# Patient Record
Sex: FEMALE | Race: WHITE | URBAN - METROPOLITAN AREA
[De-identification: names, ages, dates, MRNs, and addresses within clinical notes are randomized per-mention and may not be internally consistent; named-entity substitution may affect disease eponyms.]

---

## 2023-07-05 ENCOUNTER — TELEPHONE (OUTPATIENT)
Facility: CLINIC | Age: 43
End: 2023-07-05

## 2023-07-06 NOTE — TELEPHONE ENCOUNTER
Left message to call back. Please offer patient first available appointment with ANY provider,or she can call her PCP or go to the ER. We cannot triage her call or make sooner appointments since she is a new patient. Thank you.

## 2023-07-18 NOTE — TELEPHONE ENCOUNTER
Dr Sima Villa    Would you like to see this patient? There are openings in August.    Thank you. contact guard

## 2023-07-18 NOTE — TELEPHONE ENCOUNTER
You may add the patient onto the office schedule in August.  If her symptoms are severe she should be seen in the ED.

## 2023-07-19 NOTE — TELEPHONE ENCOUNTER
I received a call back from the patient, /name verified. She is calling from \[facility]\"". She is available from 2023- 2023. Your appointments       Date & Time Appointment Department [facility])    Aug 07, 2023  4:30 PM CDT Consult with MD Nubia Johnson, [address] [place] [address],[address], [facility] ([facility])      Nubia Barriga, [facility], [facility] [place] [address],[address] Floors  [phone]          The patient verbalized the correct date,time and location of appt. I advised the patient to go to ED if her symptoms are or become severe.

## 2023-07-19 NOTE — TELEPHONE ENCOUNTER
The patient's phone number keeps ringing. I called Hosea, not on [facility]. He stated, the patient is in [facility] but he will let her call our office.     Please transfer to [id number]    Thanks

## 2023-11-01 ENCOUNTER — ORDER TRANSCRIPTION (OUTPATIENT)
Dept: ADMINISTRATIVE | Facility: HOSPITAL | Age: 43
End: 2023-11-01

## 2023-11-01 DIAGNOSIS — Z12.31 ENCOUNTER FOR SCREENING MAMMOGRAM FOR MALIGNANT NEOPLASM OF BREAST: Primary | ICD-10-CM

## 2023-11-27 ENCOUNTER — OFFICE VISIT (OUTPATIENT)
Facility: CLINIC | Age: 43
End: 2023-11-27
Payer: COMMERCIAL

## 2023-11-27 VITALS — WEIGHT: 161 LBS | HEART RATE: 84 BPM | DIASTOLIC BLOOD PRESSURE: 83 MMHG | SYSTOLIC BLOOD PRESSURE: 127 MMHG

## 2023-11-27 DIAGNOSIS — R19.7 DIARRHEA, UNSPECIFIED TYPE: Primary | ICD-10-CM

## 2023-11-27 PROCEDURE — 3074F SYST BP LT 130 MM HG: CPT | Performed by: INTERNAL MEDICINE

## 2023-11-27 PROCEDURE — 99214 OFFICE O/P EST MOD 30 MIN: CPT | Performed by: INTERNAL MEDICINE

## 2023-11-27 PROCEDURE — 3079F DIAST BP 80-89 MM HG: CPT | Performed by: INTERNAL MEDICINE

## 2023-11-27 RX ORDER — VALACYCLOVIR HYDROCHLORIDE 500 MG/1
1000 TABLET, FILM COATED ORAL DAILY
COMMUNITY

## 2023-11-29 ENCOUNTER — LAB ENCOUNTER (OUTPATIENT)
Dept: LAB | Facility: HOSPITAL | Age: 43
End: 2023-11-29
Attending: INTERNAL MEDICINE
Payer: COMMERCIAL

## 2023-11-29 DIAGNOSIS — R19.7 DIARRHEA, UNSPECIFIED TYPE: ICD-10-CM

## 2023-11-29 PROCEDURE — 87209 SMEAR COMPLEX STAIN: CPT

## 2023-11-29 PROCEDURE — 87177 OVA AND PARASITES SMEARS: CPT

## 2023-11-29 PROCEDURE — 87427 SHIGA-LIKE TOXIN AG IA: CPT

## 2023-11-29 PROCEDURE — 87045 FECES CULTURE AEROBIC BACT: CPT

## 2023-11-29 PROCEDURE — 87493 C DIFF AMPLIFIED PROBE: CPT

## 2023-11-29 PROCEDURE — 83993 ASSAY FOR CALPROTECTIN FECAL: CPT

## 2023-11-29 PROCEDURE — 87046 STOOL CULTR AEROBIC BACT EA: CPT

## 2023-11-30 ENCOUNTER — TELEPHONE (OUTPATIENT)
Facility: CLINIC | Age: 43
End: 2023-11-30

## 2023-11-30 DIAGNOSIS — R19.5 ELEVATED FECAL CALPROTECTIN: Primary | ICD-10-CM

## 2023-11-30 DIAGNOSIS — R19.7 DIARRHEA, UNSPECIFIED TYPE: ICD-10-CM

## 2023-11-30 LAB
C DIFF TOX B STL QL: NEGATIVE
CALPROTECTIN STL-MCNT: 160 ΜG/G (ref ?–50)

## 2023-11-30 NOTE — PROGRESS NOTES
Subjective:   Patient ID: Leann Rajan is a 44year old female. HPI  The patient is self-referred on the advice of her father who is a patient of mine. 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 The patient has a history of non-Hodgkin's lymphoma treated at [facility] (chemotherapy without radiation therapy). She has been in remission for about 5 years. The patient also has a history of an immunoglobulin deficiency and is treated with IVIG infusions every 6-10 weeks typically in [place]. For the past 2-1/2-3 years the patient has had \"GI issues\". She has a history of recurrent sinusitis and has been treated with multiple courses of antibiotics typically levofloxacin. Interestingly during the COVID-19 pandemic and isolation her episodes of sinusitis resolved. In mid 2020 she developed increased stool frequency of softer stools. She has undergone multiple stool tests and reportedly has been diagnosed with pathogenic E. coli, Salmonella and Cryptosporidium. These entities have been treated. She describes a GI PCR panel performed in [place] along with stool for fecal leukocytes. The patient describes stools that are soft and loose with some degree of urgency. Every 2 months she has very urgent stools. Interestingly previous courses of levofloxacin resulted in normal bowel movements. The effect diminished within the few days and therapeutic response became nonexistent after a few months. She also recalls being treated with ivermectin and nitazoxanide. She has not used loperamide. Eating rice will help. The patient's infectious disease physician recommended a trial of IVIG monthly for 6 months which did not influence the patient's symptoms. The patient's appetite is good. Her weight is stable to increased. She denies nausea, vomiting, dysphagia, odynophagia or heartburn. She has occasional abdominal bloating with heavy foods.   She has noted no bleeding. Prior to the onset of the above symptoms she would have normal bowel movements with the exception of chronic bloating and the requirement of using baby wipes for anal hygiene. Garlic pills have helped the patient's urgency. Her last course of antibiotics was 6 months prior. Past medical history:  1. Non-Hodgkin's lymphoma in remission  2. 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 History of psoriasis  4. Varicella suppression  5. Colonoscopy about 5 years prior. Medications:  1.  Gabapentin  2. No NSAIDs    Social history:  Non-smoker    Family history: Mother had a history of upper tract symptoms and possible H. pylori. History/Other:   Review of Systems  See above    Wt Readings from Last 7 Encounters:   11/27/23 161 lb (73 kg)       Current Outpatient Medications   Medication Sig Dispense Refill    valACYclovir 500 MG Oral Tab Take 2 tablets (1,000 mg total) by mouth daily. Ascorbic Acid 100 MG Oral Chew Tab Chew by mouth As Directed. Allergies: Allergies   Allergen Reactions    Other OTHER (SEE COMMENTS)    Penicillins HIVES       Objective:   Physical Exam  Vitals and nursing note reviewed. Constitutional:       General: She is not in acute distress. Appearance: She is well-developed. She is not ill-appearing, toxic-appearing or diaphoretic. Comments: Healthy in appearance   HENT:      Head: Normocephalic and atraumatic. Mouth/Throat:      Pharynx: No oropharyngeal exudate. Eyes:      General: No scleral icterus. Conjunctiva/sclera: Conjunctivae normal.   Neck:      Thyroid: No thyromegaly. Cardiovascular:      Rate and Rhythm: Normal rate and regular rhythm. Heart sounds: Normal heart sounds. Pulmonary:      Effort: Pulmonary effort is normal. No respiratory distress. Breath sounds: Normal breath sounds. No wheezing or rales. Abdominal:      General: Bowel sounds are normal. There is no distension. Palpations: Abdomen is soft. There is no mass. Tenderness: There is no abdominal tenderness. There is no guarding or rebound. Musculoskeletal:      Cervical back: Neck supple. Lymphadenopathy:      Cervical: No cervical adenopathy. Neurological:      Mental Status: She is alert and oriented to person, place, and time. Psychiatric:         Behavior: Behavior normal.         Assessment & Plan:   1. 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 She has persistent diarrhea without associated weight loss or other alarm symptoms. Possibilities would include chronic infections including Giardia versus macroscopic or microscopic IBD related or a sprue-like syndrome related to CVID (I presume serologic testing for celiac disease was performed). I doubt a more ominous cause such as a small bowel lymphoma or protein-losing enteropathy. The patient will forward previous records if available. She will obtain stool studies for infection and a fecal calprotectin. We discussed that colonoscopy and/or upper endoscopy may be necessary to further evaluate.       Orders Placed This Encounter   Procedures    Calprotectin, Fecal    C. diff toxigenic PCR (OPT)    Stool Culture W/ Shigatoxin    Ova and Parasites, traveler outside of country       Meds This Visit:  Requested Prescriptions      No prescriptions requested or ordered in this encounter       Imaging & Referrals:  None

## 2023-12-01 NOTE — TELEPHONE ENCOUNTER
GI Schedulers    Please assist with scheduling colonoscopy/egd with Dr. Rohan Saul per orders provided below. Can we schedule prior to the new year? Thank you!

## 2023-12-01 NOTE — TELEPHONE ENCOUNTER
Scheduled for:  Colonoscopy [id number] [phone] [id number]  Provider Name:  Dr. Nick Camara  Date:  12/8/2023  Location:  [facility]  Sedation:  MAC  Time:  10am, pt is aware [facility] will call with arrival  Prep:  suprep  Meds/Allergies Reconciled?:  Physician reviewed     Diagnosis with codes:   diarrhea in the setting of immunodeficiency R19.7and an elevated fecal calprotectin. R19.5  Was patient informed to call insurance with codes (Y/N):  Yes, I confirmed [employer] ins urance with this patient. Referral sent?:  Referral was sent at the time of electronic surgical scheduling. [facility] or [address] notified?:  I sent an electronic request to Endo Scheduling and received a confirmation today. Medication Orders:  n/a  Misc Orders:  n/a     Further instructions given by staff:   I discussed the prep instructions with the patient which she verbally understood and is aware that I will send the instructions today.

## 2023-12-01 NOTE — TELEPHONE ENCOUNTER
Tacho Colon MD1 hour ago (5:58 PM)     I spoke to Akira. The stool culture and C. difficile toxin assay were negative. The fecal calprotectin was elevated at 160. Ova and parasite examinations are pending. Submitted laboratory testing is reviewed. 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 I doubt a protein-losing enteropathy or lymphoma. We will await the ova and parasite examination. I am recommending that we tentatively schedule a colonoscopy and upper endoscopy with colonic and small bowel biopsies to further investigate. GI RNs/schedulers: Please contact the patient to schedule a colonoscopy and upper endoscopy for diarrhea in the setting of immunodeficiency and an elevated fecal calprotectin. I would use a split dose Suprep and preferably monitored anesthesia care. Is there any possible way we can get the patient in before the end of the year?

## 2023-12-07 ENCOUNTER — TELEPHONE (OUTPATIENT)
Facility: CLINIC | Age: 43
End: 2023-12-07

## 2023-12-08 PROCEDURE — 87209 SMEAR COMPLEX STAIN: CPT | Performed by: INTERNAL MEDICINE

## 2023-12-08 PROCEDURE — 87177 OVA AND PARASITES SMEARS: CPT | Performed by: INTERNAL MEDICINE

## 2023-12-08 PROCEDURE — 88305 TISSUE EXAM BY PATHOLOGIST: CPT | Performed by: INTERNAL MEDICINE

## 2023-12-08 PROCEDURE — 88312 SPECIAL STAINS GROUP 1: CPT | Performed by: INTERNAL MEDICINE

## 2023-12-11 ENCOUNTER — MED REC SCAN ONLY (OUTPATIENT)
Facility: CLINIC | Age: 43
End: 2023-12-11

## 2023-12-15 DIAGNOSIS — R19.7 DIARRHEA, UNSPECIFIED TYPE: Primary | ICD-10-CM

## 2023-12-19 ENCOUNTER — TELEPHONE (OUTPATIENT)
Facility: CLINIC | Age: 43
End: 2023-12-19

## 2023-12-19 NOTE — TELEPHONE ENCOUNTER
Health maintenance updated. Last colonoscopy done 12/8/2023 by Dr Hayes Renner    Recall placed into Pt Outreach, next due on 12/2033 per Dr Hayes Renner.

## 2023-12-21 ENCOUNTER — APPOINTMENT (OUTPATIENT)
Dept: LAB | Facility: HOSPITAL | Age: 43
End: 2023-12-21
Attending: INTERNAL MEDICINE
Payer: COMMERCIAL

## 2023-12-21 PROCEDURE — 87207 SMEAR SPECIAL STAIN: CPT

## 2023-12-21 PROCEDURE — 87015 SPECIMEN INFECT AGNT CONCNTJ: CPT

## 2023-12-21 PROCEDURE — 87177 OVA AND PARASITES SMEARS: CPT

## 2023-12-21 PROCEDURE — 87209 SMEAR COMPLEX STAIN: CPT

## 2023-12-21 PROCEDURE — 87272 CRYPTOSPORIDIUM AG IF: CPT

## 2023-12-21 PROCEDURE — 87329 GIARDIA AG IA: CPT

## 2023-12-22 ENCOUNTER — LAB ENCOUNTER (OUTPATIENT)
Dept: LAB | Facility: HOSPITAL | Age: 43
End: 2023-12-22
Attending: INTERNAL MEDICINE
Payer: COMMERCIAL

## 2023-12-22 DIAGNOSIS — R19.7 DIARRHEA, UNSPECIFIED TYPE: ICD-10-CM

## 2023-12-22 LAB
CRYPTOSP AG STL QL IA: NEGATIVE
G LAMBLIA AG STL QL IA: NEGATIVE